# Patient Record
Sex: FEMALE | Race: WHITE | NOT HISPANIC OR LATINO | URBAN - NONMETROPOLITAN AREA
[De-identification: names, ages, dates, MRNs, and addresses within clinical notes are randomized per-mention and may not be internally consistent; named-entity substitution may affect disease eponyms.]

---

## 2019-01-14 ENCOUNTER — IMPORTED ENCOUNTER (OUTPATIENT)
Dept: URBAN - NONMETROPOLITAN AREA CLINIC 1 | Facility: CLINIC | Age: 59
End: 2019-01-14

## 2019-01-14 PROBLEM — H52.223: Noted: 2019-01-14

## 2019-01-14 PROBLEM — H25.13: Noted: 2019-01-14

## 2019-01-14 PROBLEM — H50.00: Noted: 2019-01-14

## 2019-01-14 PROBLEM — H53.009: Noted: 2019-01-14

## 2019-01-14 PROBLEM — H52.03: Noted: 2019-01-14

## 2019-01-14 PROCEDURE — 92015 DETERMINE REFRACTIVE STATE: CPT

## 2019-01-14 PROCEDURE — 92014 COMPRE OPH EXAM EST PT 1/>: CPT

## 2019-01-14 NOTE — PATIENT DISCUSSION
Hyperopia/astig/presby:- Discussed diagnosis in detail with patient- New GLRx given today- RTC 1 year or prnCataracts OU- Discussed diagnosis in detail with patient- Discussed signs and symptoms of progression- Discussed UV protection- No treatment needed at this time - Continue to monitorStrabismic Amblyopia OS- Discussed diagnosis in detail with patient- Long standing- Patient is stable at this time- Continue to monitorEsotropia OS:- Discussed diagnosis in detail with patient- Patient is stable at this time- Continue to monitor

## 2020-01-20 ENCOUNTER — IMPORTED ENCOUNTER (OUTPATIENT)
Dept: URBAN - NONMETROPOLITAN AREA CLINIC 1 | Facility: CLINIC | Age: 60
End: 2020-01-20

## 2020-01-20 PROCEDURE — 92015 DETERMINE REFRACTIVE STATE: CPT

## 2020-01-20 PROCEDURE — 92014 COMPRE OPH EXAM EST PT 1/>: CPT

## 2020-01-20 NOTE — PATIENT DISCUSSION
Hyperopia/astig/presby:- Discussed diagnosis in detail with patient- New GLRx given today- Continue to [facility] 1 year complete Cataracts OU- Discussed diagnosis in detail with patient- Discussed signs and symptoms of progression- Discussed UV protection- No treatment needed at this time - Continue to monitorStrabismic Amblyopia OS- Discussed diagnosis in detail with patient- Long standing- Patient is stable at this time- Continue to monitorEsotropia OS:- Discussed diagnosis in detail with patient- Patient is stable at this time- Continue to monitor

## 2021-01-22 ENCOUNTER — PREPPED CHART (OUTPATIENT)
Dept: URBAN - NONMETROPOLITAN AREA CLINIC 1 | Facility: CLINIC | Age: 61
End: 2021-01-22

## 2021-01-22 ENCOUNTER — IMPORTED ENCOUNTER (OUTPATIENT)
Dept: URBAN - NONMETROPOLITAN AREA CLINIC 1 | Facility: CLINIC | Age: 61
End: 2021-01-22

## 2021-01-22 PROCEDURE — 92015 DETERMINE REFRACTIVE STATE: CPT

## 2021-01-22 PROCEDURE — 92014 COMPRE OPH EXAM EST PT 1/>: CPT

## 2021-01-22 NOTE — PATIENT DISCUSSION
Hyperopia/astig/presby:- Discussed diagnosis in detail with patient- New GLRx given today- Continue to [facility] 1 year complete Cataracts OU- Discussed diagnosis in detail with patient- Discussed signs and symptoms of progression- Discussed UV protection- No treatment needed at this time - Continue to monitorStrabismic Amblyopia OS- Discussed diagnosis in detail with patient- Long standing- Patient is stable at this time- Continue to monitorEsotropia OS:- Discussed diagnosis in detail with patient- Patient is stable at this time- Continue to monitor

## 2021-01-22 NOTE — PATIENT DISCUSSION
Discussed findings w/ pt today. Not visually significant. Recommended UV protection. Monitor for progression.

## 2022-01-25 ASSESSMENT — TONOMETRY
OS_IOP_MMHG: 14
OD_IOP_MMHG: 14

## 2022-01-25 ASSESSMENT — VISUAL ACUITY
OS_PH: 20/70-
OS_CC: 20/100-
OD_CC: 20/20

## 2022-01-28 ENCOUNTER — ESTABLISHED PATIENT (OUTPATIENT)
Dept: URBAN - NONMETROPOLITAN AREA CLINIC 1 | Facility: CLINIC | Age: 62
End: 2022-01-28

## 2022-01-28 DIAGNOSIS — H52.4: ICD-10-CM

## 2022-01-28 PROCEDURE — 92014 COMPRE OPH EXAM EST PT 1/>: CPT

## 2022-01-28 PROCEDURE — 92015 DETERMINE REFRACTIVE STATE: CPT

## 2022-01-28 ASSESSMENT — VISUAL ACUITY
OS_CC: 20/200
OS_PH: 20/100
OD_CC: 20/20

## 2022-01-28 ASSESSMENT — TONOMETRY
OD_IOP_MMHG: 14
OS_IOP_MMHG: 14

## 2022-04-09 ASSESSMENT — VISUAL ACUITY
OD_SC: 20/20
OD_SC: 20/20
OD_SC: 20/20 SLOW
OS_SC: 20/100-
OS_SC: 20/100-
OS_PH: 20/70-
OS_SC: 20/100-

## 2022-04-09 ASSESSMENT — TONOMETRY
OS_IOP_MMHG: 14
OD_IOP_MMHG: 14
OS_IOP_MMHG: 14
OS_IOP_MMHG: 14

## 2024-02-05 ENCOUNTER — ESTABLISHED PATIENT (OUTPATIENT)
Dept: URBAN - NONMETROPOLITAN AREA CLINIC 1 | Facility: CLINIC | Age: 64
End: 2024-02-05

## 2024-02-05 DIAGNOSIS — H52.4: ICD-10-CM

## 2024-02-05 PROCEDURE — 92015 DETERMINE REFRACTIVE STATE: CPT

## 2024-02-05 PROCEDURE — 92014 COMPRE OPH EXAM EST PT 1/>: CPT

## 2024-02-05 ASSESSMENT — VISUAL ACUITY
OU_CC: 20/20-2
OD_CC: 20/20-1
OS_PH: 20/100
OS_BAT: 20/200
OS_CC: 20/200
OD_BAT: 20/40

## 2024-02-05 ASSESSMENT — TONOMETRY
OS_IOP_MMHG: 16
OD_IOP_MMHG: 16

## 2025-02-06 ENCOUNTER — COMPREHENSIVE EXAM (OUTPATIENT)
Age: 65
End: 2025-02-06

## 2025-02-06 DIAGNOSIS — H52.4: ICD-10-CM

## 2025-02-06 PROCEDURE — 92015 DETERMINE REFRACTIVE STATE: CPT

## 2025-02-06 PROCEDURE — 92014 COMPRE OPH EXAM EST PT 1/>: CPT
